# Patient Record
Sex: FEMALE | Race: WHITE
[De-identification: names, ages, dates, MRNs, and addresses within clinical notes are randomized per-mention and may not be internally consistent; named-entity substitution may affect disease eponyms.]

---

## 2020-12-28 ENCOUNTER — HOSPITAL ENCOUNTER (EMERGENCY)
Dept: HOSPITAL 26 - MED | Age: 70
Discharge: HOME | End: 2020-12-28
Payer: COMMERCIAL

## 2020-12-28 VITALS — SYSTOLIC BLOOD PRESSURE: 183 MMHG | DIASTOLIC BLOOD PRESSURE: 73 MMHG

## 2020-12-28 VITALS — BODY MASS INDEX: 34.85 KG/M2 | WEIGHT: 166 LBS | HEIGHT: 58 IN

## 2020-12-28 VITALS — DIASTOLIC BLOOD PRESSURE: 60 MMHG | SYSTOLIC BLOOD PRESSURE: 143 MMHG

## 2020-12-28 DIAGNOSIS — Z90.710: ICD-10-CM

## 2020-12-28 DIAGNOSIS — Z88.8: ICD-10-CM

## 2020-12-28 DIAGNOSIS — E11.9: ICD-10-CM

## 2020-12-28 DIAGNOSIS — Z91.012: ICD-10-CM

## 2020-12-28 DIAGNOSIS — Z98.890: ICD-10-CM

## 2020-12-28 DIAGNOSIS — I10: ICD-10-CM

## 2020-12-28 DIAGNOSIS — T78.40XA: Primary | ICD-10-CM
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
## 2020-12-28 PROCEDURE — 96374 THER/PROPH/DIAG INJ IV PUSH: CPT

## 2020-12-28 PROCEDURE — 99283 EMERGENCY DEPT VISIT LOW MDM: CPT

## 2020-12-28 NOTE — NUR
69 y/o female from home c/o possible allergic reaction. Pt states she woke up 
at 0600 with facial and lip swelling. Used epi pen at home at 0630. Denies 
respiratory symptoms.

## 2020-12-28 NOTE — NUR
Patient discharged with v/s stable. Written and verbal after care instructions 
given and explained. 

Patient alert, oriented and verbalized understanding of instructions. 
Ambulatory with steady gait. All questions addressed prior to discharge. ID 
band removed. Patient advised to follow up with PMD. Rx of Prednisone, Epipen, 
pepcid, and diphenhydramine Hydrochloride given. Patient educated on indication 
of medication including possible reaction and side effects. Opportunity to ask 
questions provided and answered.

## 2021-07-02 ENCOUNTER — HOSPITAL ENCOUNTER (EMERGENCY)
Dept: HOSPITAL 26 - MED | Age: 71
Discharge: HOME | End: 2021-07-02
Payer: COMMERCIAL

## 2021-07-02 VITALS — WEIGHT: 156 LBS | HEIGHT: 58 IN | BODY MASS INDEX: 32.75 KG/M2

## 2021-07-02 VITALS — SYSTOLIC BLOOD PRESSURE: 154 MMHG | DIASTOLIC BLOOD PRESSURE: 69 MMHG

## 2021-07-02 VITALS — SYSTOLIC BLOOD PRESSURE: 144 MMHG | DIASTOLIC BLOOD PRESSURE: 85 MMHG

## 2021-07-02 DIAGNOSIS — R11.2: ICD-10-CM

## 2021-07-02 DIAGNOSIS — E11.9: ICD-10-CM

## 2021-07-02 DIAGNOSIS — R00.0: ICD-10-CM

## 2021-07-02 DIAGNOSIS — Z91.012: ICD-10-CM

## 2021-07-02 DIAGNOSIS — R19.7: ICD-10-CM

## 2021-07-02 DIAGNOSIS — J45.909: ICD-10-CM

## 2021-07-02 DIAGNOSIS — E86.0: ICD-10-CM

## 2021-07-02 DIAGNOSIS — Z88.8: ICD-10-CM

## 2021-07-02 DIAGNOSIS — Z79.899: ICD-10-CM

## 2021-07-02 DIAGNOSIS — I10: ICD-10-CM

## 2021-07-02 DIAGNOSIS — Z79.84: ICD-10-CM

## 2021-07-02 DIAGNOSIS — R10.9: Primary | ICD-10-CM

## 2021-07-02 LAB
ALBUMIN FLD-MCNC: 4.7 G/DL (ref 3.4–5)
ANION GAP SERPL CALCULATED.3IONS-SCNC: 11.7 MMOL/L (ref 8–16)
APPEARANCE UR: CLEAR
AST SERPL-CCNC: 21 U/L (ref 15–37)
BASOPHILS # BLD AUTO: 0 K/UL (ref 0–0.22)
BASOPHILS NFR BLD AUTO: 0.1 % (ref 0–2)
BILIRUB SERPL-MCNC: 0.9 MG/DL (ref 0–1)
BILIRUB UR QL STRIP: NEGATIVE
BUN SERPL-MCNC: 14 MG/DL (ref 7–18)
CHLORIDE SERPL-SCNC: 107 MMOL/L (ref 98–107)
CO2 SERPL-SCNC: 27 MMOL/L (ref 21–32)
COLOR UR: YELLOW
CREAT SERPL-MCNC: 0.8 MG/DL (ref 0.6–1.3)
EOSINOPHIL # BLD AUTO: 0 K/UL (ref 0–0.4)
EOSINOPHIL NFR BLD AUTO: 0.3 % (ref 0–4)
ERYTHROCYTE [DISTWIDTH] IN BLOOD BY AUTOMATED COUNT: 13.2 % (ref 11.6–13.7)
GFR SERPL CREATININE-BSD FRML MDRD: (no result) ML/MIN (ref 90–?)
GLUCOSE SERPL-MCNC: 160 MG/DL (ref 74–106)
GLUCOSE UR STRIP-MCNC: NEGATIVE MG/DL
HCT VFR BLD AUTO: 39.8 % (ref 36–48)
HGB BLD-MCNC: 13.9 G/DL (ref 12–16)
HGB UR QL STRIP: NEGATIVE
LEUKOCYTE ESTERASE UR QL STRIP: NEGATIVE
LIPASE SERPL-CCNC: 152 U/L (ref 73–393)
LYMPHOCYTES # BLD AUTO: 0.4 K/UL (ref 2.5–16.5)
LYMPHOCYTES NFR BLD AUTO: 5.7 % (ref 20.5–51.1)
MCH RBC QN AUTO: 33 PG (ref 27–31)
MCHC RBC AUTO-ENTMCNC: 35 G/DL (ref 33–37)
MCV RBC AUTO: 93.6 FL (ref 80–94)
MONOCYTES # BLD AUTO: 0.1 K/UL (ref 0.8–1)
MONOCYTES NFR BLD AUTO: 1.9 % (ref 1.7–9.3)
NEUTROPHILS # BLD AUTO: 7.2 K/UL (ref 1.8–7.7)
NEUTROPHILS NFR BLD AUTO: 92 % (ref 42.2–75.2)
NITRITE UR QL STRIP: NEGATIVE
PH UR STRIP: 5.5 [PH] (ref 5–9)
PLATELET # BLD AUTO: 192 K/UL (ref 140–450)
POTASSIUM SERPL-SCNC: 3.7 MMOL/L (ref 3.5–5.1)
RBC # BLD AUTO: 4.25 MIL/UL (ref 4.2–5.4)
RBC #/AREA URNS HPF: (no result) /HPF (ref 0–5)
SODIUM SERPL-SCNC: 142 MMOL/L (ref 136–145)
WBC # BLD AUTO: 7.8 K/UL (ref 4.8–10.8)
WBC,URINE: (no result) /HPF (ref 0–5)

## 2021-07-02 PROCEDURE — 85025 COMPLETE CBC W/AUTO DIFF WBC: CPT

## 2021-07-02 PROCEDURE — 80053 COMPREHEN METABOLIC PANEL: CPT

## 2021-07-02 PROCEDURE — 70450 CT HEAD/BRAIN W/O DYE: CPT

## 2021-07-02 PROCEDURE — 96361 HYDRATE IV INFUSION ADD-ON: CPT

## 2021-07-02 PROCEDURE — 81001 URINALYSIS AUTO W/SCOPE: CPT

## 2021-07-02 PROCEDURE — 87040 BLOOD CULTURE FOR BACTERIA: CPT

## 2021-07-02 PROCEDURE — 83605 ASSAY OF LACTIC ACID: CPT

## 2021-07-02 PROCEDURE — 83690 ASSAY OF LIPASE: CPT

## 2021-07-02 PROCEDURE — 84484 ASSAY OF TROPONIN QUANT: CPT

## 2021-07-02 PROCEDURE — 36415 COLL VENOUS BLD VENIPUNCTURE: CPT

## 2021-07-02 PROCEDURE — 96374 THER/PROPH/DIAG INJ IV PUSH: CPT

## 2021-07-02 PROCEDURE — 71045 X-RAY EXAM CHEST 1 VIEW: CPT

## 2021-07-02 PROCEDURE — 99285 EMERGENCY DEPT VISIT HI MDM: CPT

## 2021-07-02 PROCEDURE — 74176 CT ABD & PELVIS W/O CONTRAST: CPT

## 2021-07-02 PROCEDURE — 93005 ELECTROCARDIOGRAM TRACING: CPT

## 2021-07-02 NOTE — NUR
71 Y/O FEMALE BIB DAUGHTER FROM HOME, C/O N/V/D AND HEADACHE 10/10 DESCRIBES AS 
SHARP NON-RADIATING TO HEAD X2DAYS, BLURRY VISION, R EYE DEVIATION TO 2 DAYS. 
PUPILS 3 MM. PT STATES FAMILY AT HOME WITH SAME SYMPTOMS. DENIES FEVER/CHILLS. 
ABDOMEN IS SOFT, ROUND, NON-TENDER TO PALPATION. BOWEL SOUNDS ACTIVE X4 LAST BM 
7/2/21.



PMH: HTN, DM, ASTHMA, RA, LIVER CIRRHOSIS



ALLERGIES: EGGS

## 2021-07-02 NOTE — NUR
Patient discharged with v/s stable. Written and verbal after care instructions 
given FOR VIRAL GASTROENTERITIS and explained. 

Patient alert, oriented and verbalized understanding of instructions. 
Ambulatory with steady gait. All questions addressed prior to discharge. ID 
band removed. Patient advised to follow up with PMD. Rx of ZOFRAN 4MG ODT PRN 
Q8H PRN N/V, AND PEPCID 20MG PO BID PRN ACID REFLUX given. Patient educated on 
indication of medication including possible reaction and side effects. 
Opportunity to ask questions provided and answered.

## 2021-07-02 NOTE — NUR
PT DENIED COVID SWAB FOR POSSIBLE PENDING ADMISION. MD AVITIA MADE AWARE AND 
CHARGE MARIANA DELEON MADE AWARE

-------------------------------------------------------------------------------

Addendum: 07/02/21 at 1439 by [email or web address]

-------------------------------------------------------------------------------

PT STATED SHE IS FULLY VACCINATED FROM COVID

## 2021-07-11 ENCOUNTER — HOSPITAL ENCOUNTER (EMERGENCY)
Dept: HOSPITAL 26 - MED | Age: 71
Discharge: HOME | End: 2021-07-11
Payer: COMMERCIAL

## 2021-07-11 VITALS — SYSTOLIC BLOOD PRESSURE: 179 MMHG | DIASTOLIC BLOOD PRESSURE: 69 MMHG

## 2021-07-11 VITALS — BODY MASS INDEX: 32.75 KG/M2 | HEIGHT: 58 IN | WEIGHT: 156 LBS

## 2021-07-11 VITALS — SYSTOLIC BLOOD PRESSURE: 145 MMHG | DIASTOLIC BLOOD PRESSURE: 75 MMHG

## 2021-07-11 DIAGNOSIS — Z91.018: ICD-10-CM

## 2021-07-11 DIAGNOSIS — E11.9: ICD-10-CM

## 2021-07-11 DIAGNOSIS — K08.89: Primary | ICD-10-CM

## 2021-07-11 DIAGNOSIS — Z79.899: ICD-10-CM

## 2021-07-11 DIAGNOSIS — J45.909: ICD-10-CM

## 2021-07-11 DIAGNOSIS — I10: ICD-10-CM

## 2021-07-11 PROCEDURE — 64400 NJX AA&/STRD TRIGEMINAL NRV: CPT

## 2021-07-11 PROCEDURE — 99284 EMERGENCY DEPT VISIT MOD MDM: CPT

## 2021-07-11 NOTE — NUR
69 Y/O PATIENT PRESENTS TO ED WITH  TOOTHACHE. PT STATES "I AM IN SO MUCH PAIN 
I FEEL LIKE MY TOOTH WAS INFECTED." DENIES N/V/D; SKIN IS PINK/WARM/DRY; AAOX4 
WITH EVEN AND STEADY GAIT; LUNGS CLEAR BL; HR EVEN AND REGULAR; PT DENIES ANY 
FEVER, CP, SOB, OR COUGH AT THIS TIME; PATIENT STATES PAIN OF 10/10 AT THIS 
TIME; VSS; PATIENT POSITIONED FOR COMFORT; HOB ELEVATED; BEDRAILS UP X2; BED 
DOWN. ER MD MADE AWARE OF PT STATUS.





ALLERGIES: EGGS

PMH: DM. HTN, ASTHMA

## 2021-07-11 NOTE — NUR
Patient discharged with v/s stable. Written and verbal after care instructions 
given and explained. 

Patient alert, oriented and verbalized understanding of instructions. 
Ambulatory with steady gait. All questions addressed prior to discharge. ID 
band removed. Patient advised to follow up with PMD. Rx of MOTRIN, CATAPRES, 
AND AMOXICCILIN given. Patient educated on indication of medication including 
possible reaction and side effects. Opportunity to ask questions provided and 
answered.